# Patient Record
Sex: FEMALE | Race: WHITE | NOT HISPANIC OR LATINO | Employment: OTHER | URBAN - NONMETROPOLITAN AREA
[De-identification: names, ages, dates, MRNs, and addresses within clinical notes are randomized per-mention and may not be internally consistent; named-entity substitution may affect disease eponyms.]

---

## 2024-09-20 ENCOUNTER — HOSPITAL ENCOUNTER (EMERGENCY)
Facility: HOSPITAL | Age: 39
Discharge: HOME | End: 2024-09-20
Attending: EMERGENCY MEDICINE
Payer: COMMERCIAL

## 2024-09-20 VITALS
RESPIRATION RATE: 16 BRPM | OXYGEN SATURATION: 99 % | WEIGHT: 125 LBS | BODY MASS INDEX: 23.6 KG/M2 | HEART RATE: 67 BPM | SYSTOLIC BLOOD PRESSURE: 139 MMHG | HEIGHT: 61 IN | TEMPERATURE: 99 F | DIASTOLIC BLOOD PRESSURE: 70 MMHG

## 2024-09-20 DIAGNOSIS — K08.89 TOOTHACHE: Primary | ICD-10-CM

## 2024-09-20 PROCEDURE — 99282 EMERGENCY DEPT VISIT SF MDM: CPT

## 2024-09-20 PROCEDURE — 99284 EMERGENCY DEPT VISIT MOD MDM: CPT | Performed by: EMERGENCY MEDICINE

## 2024-09-20 RX ORDER — HYDROCODONE BITARTRATE AND ACETAMINOPHEN 5; 325 MG/1; MG/1
1 TABLET ORAL ONCE
Status: COMPLETED | OUTPATIENT
Start: 2024-09-20 | End: 2024-09-20

## 2024-09-20 RX ORDER — HYDROCODONE BITARTRATE AND ACETAMINOPHEN 5; 325 MG/1; MG/1
1 TABLET ORAL EVERY 6 HOURS PRN
Qty: 12 TABLET | Refills: 0 | Status: SHIPPED | OUTPATIENT
Start: 2024-09-20

## 2024-09-20 RX ORDER — PENICILLIN V POTASSIUM 250 MG/1
500 TABLET, FILM COATED ORAL ONCE
Status: COMPLETED | OUTPATIENT
Start: 2024-09-20 | End: 2024-09-20

## 2024-09-20 RX ORDER — PENICILLIN V POTASSIUM 500 MG/1
500 TABLET, FILM COATED ORAL 4 TIMES DAILY
Qty: 28 TABLET | Refills: 0 | Status: SHIPPED | OUTPATIENT
Start: 2024-09-20 | End: 2024-09-27

## 2024-09-20 RX ADMIN — HYDROCODONE BITARTRATE AND ACETAMINOPHEN 1 TABLET: 5; 325 TABLET ORAL at 20:16

## 2024-09-20 RX ADMIN — PENICILLIN V POTASSIUM 500 MG: 250 TABLET, FILM COATED ORAL at 20:16

## 2024-09-21 NOTE — ED PROVIDER NOTES
1. Toothache      ED Disposition       ED Disposition   Discharge    Condition   Stable    Date/Time   Fri Sep 20, 2024  8:09 PM    Comment   Alexia Quesada discharge to home/self care.                   Assessment & Plan       Medical Decision Making  Patient presents for dental pain due to suspected dental carry.  Patient not immunosuppressed, afebrile and well-appearing with patent airway.  Have low suspicion for deep space infection or any concern for airway compromise.  Based on history and physical, no evidence of tooth fracture, avulsion or bleeding socket.  No evidence of retropharyngeal abscess, peritonsillar abscess, Marga's angina or periapical abscess.  Instructed patient to treat pain with OTC anti-inflammatories until able to see a dentist.  Provided antibiotics for likely infection.  Provided with prescription for small amount of pain medication which patient was advised to use sparingly and warned of addictive nature.  Patient discharged home and advised close follow-up with a dentist.  Discussed return precautions.    Problems Addressed:  Toothache: acute illness or injury    Risk  OTC drugs.  Prescription drug management.                     Medications   penicillin V potassium (VEETID) tablet 500 mg (has no administration in time range)   HYDROcodone-acetaminophen (NORCO) 5-325 mg per tablet 1 tablet (has no administration in time range)       History of Present Illness       Patient is a 39-year-old female presenting to the emergency department complaining of pain to her right jaw, bothering her for the last week or so, she does report some poor dentition, attempted to follow-up with a dentist but unable to get an appointment, denies any injury, no difficulty breathing, no chest pain or shortness of breath        Review of Systems   Constitutional: Negative.    HENT:  Positive for dental problem.    Eyes: Negative.    Respiratory: Negative.     Cardiovascular: Negative.    Gastrointestinal:  Negative.    Endocrine: Negative.    Genitourinary: Negative.    Musculoskeletal: Negative.    Skin: Negative.    Allergic/Immunologic: Negative.    Neurological: Negative.    Hematological: Negative.    Psychiatric/Behavioral: Negative.             Objective     ED Triage Vitals [09/20/24 2002]   Temperature Pulse Blood Pressure Respirations SpO2 Patient Position - Orthostatic VS   99 °F (37.2 °C) 67 139/70 16 99 % Sitting      Temp Source Heart Rate Source BP Location FiO2 (%) Pain Score    Temporal Monitor Right arm -- 8        Physical Exam  Constitutional:       Appearance: She is well-developed.   HENT:      Head: Normocephalic and atraumatic.      Mouth/Throat:      Comments: Poor dentition, in particular the right mandibular molar is discolored brown centrally, there is slight gingival irritation, no fluctuance, airway patent  Eyes:      Conjunctiva/sclera: Conjunctivae normal.      Pupils: Pupils are equal, round, and reactive to light.   Cardiovascular:      Rate and Rhythm: Normal rate.   Pulmonary:      Effort: Pulmonary effort is normal.   Abdominal:      Palpations: Abdomen is soft.   Musculoskeletal:         General: Normal range of motion.      Cervical back: Normal range of motion and neck supple.   Skin:     General: Skin is warm and dry.   Neurological:      Mental Status: She is alert and oriented to person, place, and time.         Labs Reviewed - No data to display  No orders to display       Procedures    ED Medication and Procedure Management   None     Patient's Medications   Discharge Prescriptions    HYDROCODONE-ACETAMINOPHEN (NORCO) 5-325 MG PER TABLET    Take 1 tablet by mouth every 6 (six) hours as needed for pain for up to 12 doses Max Daily Amount: 4 tablets       Start Date: 9/20/2024 End Date: --       Order Dose: 1 tablet       Quantity: 12 tablet    Refills: 0    PENICILLIN V POTASSIUM (VEETID) 500 MG TABLET    Take 1 tablet (500 mg total) by mouth 4 (four) times a day for 7 days        Start Date: 9/20/2024 End Date: 9/27/2024       Order Dose: 500 mg       Quantity: 28 tablet    Refills: 0     No discharge procedures on file.     Osiris Olvera DO  09/20/24 2015